# Patient Record
Sex: MALE | Race: WHITE | HISPANIC OR LATINO
[De-identification: names, ages, dates, MRNs, and addresses within clinical notes are randomized per-mention and may not be internally consistent; named-entity substitution may affect disease eponyms.]

---

## 2019-12-27 PROBLEM — Z00.00 ENCOUNTER FOR PREVENTIVE HEALTH EXAMINATION: Status: ACTIVE | Noted: 2019-12-27

## 2020-02-21 ENCOUNTER — APPOINTMENT (OUTPATIENT)
Dept: NEUROLOGY | Facility: CLINIC | Age: 70
End: 2020-02-21

## 2022-12-21 ENCOUNTER — EMERGENCY (EMERGENCY)
Facility: HOSPITAL | Age: 72
LOS: 1 days | Discharge: HOME | End: 2022-12-21
Attending: EMERGENCY MEDICINE
Payer: MEDICARE

## 2022-12-21 VITALS
DIASTOLIC BLOOD PRESSURE: 95 MMHG | RESPIRATION RATE: 20 BRPM | SYSTOLIC BLOOD PRESSURE: 177 MMHG | HEART RATE: 111 BPM | OXYGEN SATURATION: 95 % | TEMPERATURE: 99 F

## 2022-12-21 VITALS — HEART RATE: 90 BPM

## 2022-12-21 PROCEDURE — 99284 EMERGENCY DEPT VISIT MOD MDM: CPT

## 2022-12-21 PROCEDURE — 99283 EMERGENCY DEPT VISIT LOW MDM: CPT

## 2022-12-21 RX ORDER — IBUPROFEN 200 MG
1 TABLET ORAL
Qty: 21 | Refills: 0
Start: 2022-12-21 | End: 2022-12-27

## 2022-12-21 RX ORDER — IBUPROFEN 200 MG
600 TABLET ORAL ONCE
Refills: 0 | Status: DISCONTINUED | OUTPATIENT
Start: 2022-12-21 | End: 2022-12-29

## 2022-12-21 NOTE — ED PROVIDER NOTE - NS ED ROS FT
Gen: denies fever, chills, fatigue, weight loss  Skin: denies rashes, laceration, bruising  HEENT: denies visual changes, ear pain, nasal congestion, throat pain  Respiratory: Admits cough. denies ANDREW, SOB, wheezing  Cardiovascular:  denies chest pain, palpitations, diaphoresis, LE edema  GI: denies abdominal pain, n/v/d  : denies dysuria, frequency, urgency, bowel/bladder incontinence  MSK: denies joint swelling/pain, back pain, neck pain  Neuro: denies headache, dizziness, weakness, numbness  Psych: denies anxiety, depression, SI/HI, visual/auditory hallucinations

## 2022-12-21 NOTE — ED PROVIDER NOTE - OBJECTIVE STATEMENT
72y male no PMHx presents with flu like symptoms. Pt reports 3 days of nausea with vomiting that resolved today. Pt reports cough, nasal congestion and body aches today. pt reports taking tylenol 2 hours PTA without relief of symptoms. Pt reports tactile fever. Pt denies being vaccinated for flu. Pt denies HA, chest pain, SOB, dizziness, nausea, vomiting, abdominal pain.

## 2022-12-21 NOTE — ED PROVIDER NOTE - PHYSICAL EXAMINATION
Gen: No acute distress, non toxic  HEENT: Mucous membranes moist, pink conjunctivae, EOMI. PERRL. Airway patent.   CV: RRR, nl s1/s2.  Resp: CTAB, normal rate and effort. No wheezes, rhonchi, or crackles.   GI: Abdomen soft, NT, ND. No rebound, no guarding  Neuro: A&O x4, Gait intact.  MSK: No midline spinal ttp. No visualized or palpable deformities.  Skin: No rashes, skin intact and well perfused. Cap refill <2sec  Vascular: Radial and dorsalis pedal pulses 2+ b/l

## 2022-12-21 NOTE — ED PROVIDER NOTE - PATIENT PORTAL LINK FT
You can access the FollowMyHealth Patient Portal offered by [facility] by registering at the following website: [email or web address]. By joining [employer]’s FollowMyHealth portal, you will also be able to view your health information using other applications (apps) compatible with our system.

## 2022-12-21 NOTE — ED PROVIDER NOTE - ATTENDING APP SHARED VISIT CONTRIBUTION OF CARE
The patient discussed with PA    Viral syndrome    I, Tony Beyer, performed the initial face to face bedside interview with this patient regarding history of present illness, review of symptoms and relevant past medical, social and family history.  I completed an independent physical examination.  I was the initial provider who evaluated this patient. I have signed out the follow up of any pending tests (i.e. labs, radiological studies) to the ACP.  I have communicated the patient’s plan of care and disposition with the ACP. The patient discussed with PA    Viral syndrome  .  I, Toyn Beyer, performed the initial face to face bedside interview with this patient regarding history of present illness, review of symptoms and relevant past medical, social and family history.  I completed an independent physical examination.  I was the initial provider who evaluated this patient. I have signed out the follow up of any pending tests (i.e. labs, radiological studies) to the ACP.  I have communicated the patient’s plan of care and disposition with the ACP.

## 2022-12-21 NOTE — ED PROVIDER NOTE - NS ED ATTENDING STATEMENT MOD
This was a shared visit with the RYANN. I reviewed and verified the documentation and independently performed the documented:

## 2022-12-21 NOTE — ED PROVIDER NOTE - CLINICAL SUMMARY MEDICAL DECISION MAKING FREE TEXT BOX
72y male with cough, nasal congestion, tactile fever, episodes of diarrhea, vomiting. Flu w/covid, IBU. Follow up out patient.

## 2022-12-21 NOTE — ED PROVIDER NOTE - NSFOLLOWUPINSTRUCTIONS_ED_ALL_ED_FT
- Regrese al servicio de urgencias por cualquier síntoma nuevo o que empeore, incluidos, entre otros, dificultad para respirar, debilidad grave, confusión, etc.  - [place] Tylenol (acetaminofén) 650 mg según sea necesario para el dolor o la fiebre  - Manténgase hasmukh hidratado.  - sae la medicación según las indicaciones  - Evite el contacto con cualquier persona que esté enferma O con poblaciones de riesgo, incluidos ancianos, jóvenes, pacientes embarazadas, personas inmunodeprimidas  - Lávese las erickson con frecuencia con agua tibia y jabón efraín al menos 20 segundos      Infección respiratoria viral    Gordo infección respiratoria viral es gordo enfermedad que afecta partes del cuerpo que se usan para respirar, shmuel los pulmones, la nariz y la garganta. Es causada por un germen llamado virus. Los síntomas pueden incluir secreción nasal, tos, estornudos, fatiga, gale corporales, dolor de garganta, fiebre o dolor de chalo. Se pueden usar medicamentos de venta vera para controlar los síntomas, fidel la infección generalmente desaparece por sí jr en 5 a 10 días.    BUSQUE ATENCIÓN MÉDICA INMEDIATA SI TIENE ALGUNO DE LOS SIGUIENTES SÍNTOMAS: dificultad para respirar, dolor en el pecho, fiebre efraín 10 días o aturdimiento/mareos.    - Return to the ED for any new or worsening symptoms including but not limited to difficulty breathing, severe weakness, confusion, etc.  - Take Tylenol (Acetaminophen) 650mg as needed for pain or fever  - Stay well hydrated.   - take medication as directed   - Avoid contact with anybody who is sick OR at risk populations including elderly, young, pregnant patients, immune compromised people  - Wash hands frequently in warm soapy water for at least 20 seconds       Viral Respiratory Infection    A viral respiratory infection is an illness that affects parts of the body used for breathing, like the lungs, nose, and throat. It is caused by a germ called a virus. Symptoms can include runny nose, coughing, sneezing, fatigue, body aches, sore throat, fever, or headache. Over the counter medicine can be used to manage the symptoms but the infection typically goes away on its own in 5 to 10 days.     SEEK IMMEDIATE MEDICAL CARE IF YOU HAVE ANY OF THE FOLLOWING SYMPTOMS: shortness of breath, chest pain, fever over 10 days, or lightheadedness/dizziness.

## 2023-01-25 ENCOUNTER — EMERGENCY (EMERGENCY)
Facility: HOSPITAL | Age: 73
LOS: 1 days | Discharge: HOME | End: 2023-01-25
Attending: STUDENT IN AN ORGANIZED HEALTH CARE EDUCATION/TRAINING PROGRAM
Payer: MEDICARE

## 2023-01-25 VITALS
HEART RATE: 72 BPM | DIASTOLIC BLOOD PRESSURE: 98 MMHG | SYSTOLIC BLOOD PRESSURE: 150 MMHG | OXYGEN SATURATION: 95 % | RESPIRATION RATE: 20 BRPM | TEMPERATURE: 98 F

## 2023-01-25 VITALS
RESPIRATION RATE: 18 BRPM | OXYGEN SATURATION: 95 % | HEART RATE: 80 BPM | DIASTOLIC BLOOD PRESSURE: 90 MMHG | SYSTOLIC BLOOD PRESSURE: 145 MMHG | TEMPERATURE: 98 F

## 2023-01-25 LAB
ABO RH CONFIRMATION: SIGNIFICANT CHANGE UP
ALBUMIN SERPL ELPH-MCNC: 4.2 G/DL — SIGNIFICANT CHANGE UP (ref 3.3–5.2)
ALP SERPL-CCNC: 77 U/L — SIGNIFICANT CHANGE UP (ref 40–120)
ALT FLD-CCNC: 19 U/L — SIGNIFICANT CHANGE UP
ANION GAP SERPL CALC-SCNC: 8 MMOL/L — SIGNIFICANT CHANGE UP (ref 5–17)
APTT BLD: 39.8 SEC — HIGH (ref 27.5–35.5)
AST SERPL-CCNC: 22 U/L — SIGNIFICANT CHANGE UP
BASOPHILS # BLD AUTO: 0.02 K/UL — SIGNIFICANT CHANGE UP (ref 0–0.2)
BASOPHILS NFR BLD AUTO: 0.2 % — SIGNIFICANT CHANGE UP (ref 0–2)
BILIRUB SERPL-MCNC: 2.4 MG/DL — HIGH (ref 0.4–2)
BLD GP AB SCN SERPL QL: SIGNIFICANT CHANGE UP
BUN SERPL-MCNC: 19.8 MG/DL — SIGNIFICANT CHANGE UP (ref 8–20)
CALCIUM SERPL-MCNC: 9 MG/DL — SIGNIFICANT CHANGE UP (ref 8.4–10.5)
CHLORIDE SERPL-SCNC: 106 MMOL/L — SIGNIFICANT CHANGE UP (ref 96–108)
CO2 SERPL-SCNC: 24 MMOL/L — SIGNIFICANT CHANGE UP (ref 22–29)
CREAT SERPL-MCNC: 0.92 MG/DL — SIGNIFICANT CHANGE UP (ref 0.5–1.3)
EGFR: 89 ML/MIN/1.73M2 — SIGNIFICANT CHANGE UP
EOSINOPHIL # BLD AUTO: 0.03 K/UL — SIGNIFICANT CHANGE UP (ref 0–0.5)
EOSINOPHIL NFR BLD AUTO: 0.3 % — SIGNIFICANT CHANGE UP (ref 0–6)
ETHANOL SERPL-MCNC: 12 MG/DL — HIGH (ref 0–9)
GLUCOSE SERPL-MCNC: 137 MG/DL — HIGH (ref 70–99)
HCT VFR BLD CALC: 43.1 % — SIGNIFICANT CHANGE UP (ref 39–50)
HGB BLD-MCNC: 14.7 G/DL — SIGNIFICANT CHANGE UP (ref 13–17)
IMM GRANULOCYTES NFR BLD AUTO: 0.6 % — SIGNIFICANT CHANGE UP (ref 0–0.9)
INR BLD: 1.21 RATIO — HIGH (ref 0.88–1.16)
LIDOCAIN IGE QN: 22 U/L — SIGNIFICANT CHANGE UP (ref 22–51)
LYMPHOCYTES # BLD AUTO: 0.8 K/UL — LOW (ref 1–3.3)
LYMPHOCYTES # BLD AUTO: 7.3 % — LOW (ref 13–44)
MCHC RBC-ENTMCNC: 31.1 PG — SIGNIFICANT CHANGE UP (ref 27–34)
MCHC RBC-ENTMCNC: 34.1 GM/DL — SIGNIFICANT CHANGE UP (ref 32–36)
MCV RBC AUTO: 91.3 FL — SIGNIFICANT CHANGE UP (ref 80–100)
MONOCYTES # BLD AUTO: 0.67 K/UL — SIGNIFICANT CHANGE UP (ref 0–0.9)
MONOCYTES NFR BLD AUTO: 6.1 % — SIGNIFICANT CHANGE UP (ref 2–14)
NEUTROPHILS # BLD AUTO: 9.32 K/UL — HIGH (ref 1.8–7.4)
NEUTROPHILS NFR BLD AUTO: 85.5 % — HIGH (ref 43–77)
PLATELET # BLD AUTO: 109 K/UL — LOW (ref 150–400)
POTASSIUM SERPL-MCNC: 4.1 MMOL/L — SIGNIFICANT CHANGE UP (ref 3.5–5.3)
POTASSIUM SERPL-SCNC: 4.1 MMOL/L — SIGNIFICANT CHANGE UP (ref 3.5–5.3)
PROT SERPL-MCNC: 6.9 G/DL — SIGNIFICANT CHANGE UP (ref 6.6–8.7)
PROTHROM AB SERPL-ACNC: 14.1 SEC — HIGH (ref 10.5–13.4)
RBC # BLD: 4.72 M/UL — SIGNIFICANT CHANGE UP (ref 4.2–5.8)
RBC # FLD: 14.3 % — SIGNIFICANT CHANGE UP (ref 10.3–14.5)
SARS-COV-2 RNA SPEC QL NAA+PROBE: SIGNIFICANT CHANGE UP
SODIUM SERPL-SCNC: 138 MMOL/L — SIGNIFICANT CHANGE UP (ref 135–145)
WBC # BLD: 10.9 K/UL — HIGH (ref 3.8–10.5)
WBC # FLD AUTO: 10.9 K/UL — HIGH (ref 3.8–10.5)

## 2023-01-25 PROCEDURE — U0003: CPT

## 2023-01-25 PROCEDURE — G1004: CPT

## 2023-01-25 PROCEDURE — 80307 DRUG TEST PRSMV CHEM ANLYZR: CPT

## 2023-01-25 PROCEDURE — 71260 CT THORAX DX C+: CPT | Mod: MA

## 2023-01-25 PROCEDURE — 72125 CT NECK SPINE W/O DYE: CPT | Mod: MA

## 2023-01-25 PROCEDURE — 71045 X-RAY EXAM CHEST 1 VIEW: CPT | Mod: 26

## 2023-01-25 PROCEDURE — 86900 BLOOD TYPING SEROLOGIC ABO: CPT

## 2023-01-25 PROCEDURE — 83690 ASSAY OF LIPASE: CPT

## 2023-01-25 PROCEDURE — 73600 X-RAY EXAM OF ANKLE: CPT | Mod: 26,LT

## 2023-01-25 PROCEDURE — 73564 X-RAY EXAM KNEE 4 OR MORE: CPT

## 2023-01-25 PROCEDURE — 70450 CT HEAD/BRAIN W/O DYE: CPT | Mod: MA

## 2023-01-25 PROCEDURE — U0005: CPT

## 2023-01-25 PROCEDURE — 73590 X-RAY EXAM OF LOWER LEG: CPT

## 2023-01-25 PROCEDURE — 86850 RBC ANTIBODY SCREEN: CPT

## 2023-01-25 PROCEDURE — 36415 COLL VENOUS BLD VENIPUNCTURE: CPT

## 2023-01-25 PROCEDURE — 85610 PROTHROMBIN TIME: CPT

## 2023-01-25 PROCEDURE — 29515 APPLICATION SHORT LEG SPLINT: CPT | Mod: LT

## 2023-01-25 PROCEDURE — 74177 CT ABD & PELVIS W/CONTRAST: CPT | Mod: MA

## 2023-01-25 PROCEDURE — 73564 X-RAY EXAM KNEE 4 OR MORE: CPT | Mod: 26,LT

## 2023-01-25 PROCEDURE — 71045 X-RAY EXAM CHEST 1 VIEW: CPT

## 2023-01-25 PROCEDURE — 85025 COMPLETE CBC W/AUTO DIFF WBC: CPT

## 2023-01-25 PROCEDURE — 86901 BLOOD TYPING SEROLOGIC RH(D): CPT

## 2023-01-25 PROCEDURE — 73700 CT LOWER EXTREMITY W/O DYE: CPT | Mod: MG

## 2023-01-25 PROCEDURE — 73590 X-RAY EXAM OF LOWER LEG: CPT | Mod: 26,LT

## 2023-01-25 PROCEDURE — 80053 COMPREHEN METABOLIC PANEL: CPT

## 2023-01-25 PROCEDURE — 73600 X-RAY EXAM OF ANKLE: CPT

## 2023-01-25 PROCEDURE — 74177 CT ABD & PELVIS W/CONTRAST: CPT | Mod: 26,MA

## 2023-01-25 PROCEDURE — 99285 EMERGENCY DEPT VISIT HI MDM: CPT | Mod: 57

## 2023-01-25 PROCEDURE — 99284 EMERGENCY DEPT VISIT MOD MDM: CPT | Mod: 25

## 2023-01-25 PROCEDURE — 28400 CLTX CALCANEAL FX W/O MNPJ: CPT | Mod: 54,LT

## 2023-01-25 PROCEDURE — 85730 THROMBOPLASTIN TIME PARTIAL: CPT

## 2023-01-25 PROCEDURE — 71260 CT THORAX DX C+: CPT | Mod: 26,MA

## 2023-01-25 PROCEDURE — 70450 CT HEAD/BRAIN W/O DYE: CPT | Mod: 26,MA

## 2023-01-25 PROCEDURE — 73700 CT LOWER EXTREMITY W/O DYE: CPT | Mod: 26,LT,MG

## 2023-01-25 PROCEDURE — 83605 ASSAY OF LACTIC ACID: CPT

## 2023-01-25 PROCEDURE — 72125 CT NECK SPINE W/O DYE: CPT | Mod: 26,MA

## 2023-01-25 RX ORDER — OXYCODONE AND ACETAMINOPHEN 5; 325 MG/1; MG/1
1 TABLET ORAL
Qty: 10 | Refills: 0
Start: 2023-01-25

## 2023-01-25 RX ORDER — OXYCODONE AND ACETAMINOPHEN 5; 325 MG/1; MG/1
1 TABLET ORAL ONCE
Refills: 0 | Status: DISCONTINUED | OUTPATIENT
Start: 2023-01-25 | End: 2023-01-25

## 2023-01-25 NOTE — ED PROVIDER NOTE - PATIENT PORTAL LINK FT
You can access the FollowMyHealth Patient Portal offered by [facility] by registering at the following website: [email or web address]. By joining [employer]’s FollowMyHealth portal, you will also be able to view your health information using other applications (apps) compatible with our system.

## 2023-01-25 NOTE — H&P ADULT - NSHPPHYSICALEXAM_GEN_ALL_CORE
Constitutional: Male in no acute distress,   HEENT: abrasion right forehead, Head is normocephalic , maxillofacial structures stable, no blood or discharge from nares or oral cavity, no sierra sign / racoon eyes, EOMI b/l, pupils [2 ]mm round and reactive to light b/l, no active drainage or redness  Neck: cervical collar in place, trachea midline  Respiratory: Breath sounds CTA b/l respirations are unlabored, no accessory muscle use, no conversational dyspnea  Cardiovascular: Regular rate & rhythm, +S1, S2  Chest: Chest wall is non-tender to palpation, no subQ emphysema or crepitus palpated  Gastrointestinal: Abdomen soft, non-tender, non-distended, no rebound tenderness / guarding, no ecchymosis or external signs of abdominal trauma  Extremities: +echymosis and ttp to left ankle,  moving all extremities spontaneously, no point tenderness or deformity noted to upper or lower extremities b/l  Pelvis: stable  Vascular: 2+ radial, femoral, and DP pulses b/l  Neurological: GCS: 15 (4/5/6). A&O x 3; no gross sensory / motor / coordination deficits  Musculoskeletal: 5/5 strength of upper and lower extremities b/l  Back: +c/t spine ttp, no /LS spine tenderness to palpation, no step-offs or signs of external trauma to the back

## 2023-01-25 NOTE — ED PROVIDER NOTE - PHYSICAL EXAMINATION
Const: Awake, alert and oriented. In no acute distress. Well appearing.  HEENT: NC/AT. No raccoon eyes, no sierra sign, no epistaxis, no septal hematoma, no intraoral lacerations or bleeding, no tongue (+) abrasion to left forehead  Eyes: No scleral icterus. EOMI.  Neck:. Cervical collar in place. (+) cervical tenderness. No palpable step offs.  Chest: Chest wall stable, no flail chest, no crepitus on palpation.  Cardiac: Regular rate and regular rhythm. +S1/S2. No murmurs. 2+ Central pulses and symmetric. Peripheral pulses 2+ and symmetric. No LE edema.  Resp: Speaking in full sentences. No evidence of respiratory distress. No wheezes, rales or rhonchi.  Abd: Soft, non-tender, non-distended. Normal bowel sounds in all 4 quadrants. No guarding or rebound.  MSK: Pelvis stable. (+) left ankle tenderness.   Back: (+) thoracic tenderness. No palpable step offs.  Skin: No rashes, abrasions or lacerations.  Neuro: Awake, alert & oriented x 3. GCS 15. Withdraws to pain symmetrically. Follows commands. 5/5 strength symmetrically all extremities.

## 2023-01-25 NOTE — ED PROVIDER NOTE - NS ED ROS FT
Review of Systems  •	CONSTITUTIONAL - no  fever, no diaphoresis, no weight change  •	SKIN - no rash  •	HEMATOLOGIC - no bleeding, no bruising  •	EYES - no eye pain, no blurred vision  •	ENT - no change in hearing, no pain  •	RESPIRATORY - no shortness of breath, no cough  •	CARDIAC - no chest pain, no palpitations  •	GI - no abd pain, no nausea, no vomiting, no diarrhea, no constipation, no bleeding  •	GENITO-URINARY - no discharge, no dysuria; no hematuria,   •	ENDO - no polydipsia, no polyuria, no heat/no cold intolerance  •	MUSCULOSKELETAL - (+) left ankle joint pain, no swelling, no redness  •	NEUROLOGIC - no weakness, no headache, no anesthesia, no paresthesias  •	PSYCH - no anxiety, non suicidal, non homicidal, no hallucination, no depression

## 2023-01-25 NOTE — ED PROVIDER NOTE - CARE PROVIDER_API CALL
Sergei Camarillo (DPM)  Podiatric Medicine and Surgery  [address] [place] [address]  Phone: [phone]  Fax: [phone]  Follow Up Time: 4-6 Days

## 2023-01-25 NOTE — ED PROVIDER NOTE - OBJECTIVE STATEMENT
71 yo M hx of BPH and abdominal hernia p/w left ankle injury and head injury s/p fall from 30 ft ladder. patient report LOC. no anticoagulation use. His friend drove him to the hospital. trauma B activated.

## 2023-01-25 NOTE — H&P ADULT - NSHPLABSRESULTS_GEN_ALL_CORE
< from: CT Cervical Spine No Cont (01.25.23 @ 16:30) >  BRAIN:  No mass effect, hemorrhage orevidence of acute intracranial pathology.  CERVICAL SPINE:  No fracture or traumatic subluxation.      < from: Xray Chest 1 View AP/PA. (01.25.23 @ 16:26) >  IMPRESSION: No acute finding.

## 2023-01-25 NOTE — H&P ADULT - ASSESSMENT
72yM, fell from ladder, GCS 15, primary survey intact.   -ct h/cspine/c/a/p  -xrays left ankle, tib/fib  -pain control prn  -f/u labs  -dispo per ED if no injuries  72yM, fell from ladder, GCS 15, primary survey intact.. Panscan completed, only has a left calcaneous fracture.    -No trauma surgical intervention required  -Disposition per ED  -pain control prn  -f/u labs  -dispo per ED if no injuries

## 2023-01-25 NOTE — H&P ADULT - ATTENDING SUPERVISION STATEMENT
Problem: Infection - Risk of, Central Venous Catheter-Associated Bloodstream Infection  Goal: *Absence of infection signs and symptoms  Outcome: Progressing Towards Goal     Problem: Patient Education: Go to Patient Education Activity  Goal: Patient/Family Education  Outcome: Progressing Towards Goal Resident

## 2023-01-25 NOTE — H&P ADULT - HISTORY OF PRESENT ILLNESS
72yM, pmhx BPH, presents as trauma B he drove himself to hospital and walked into ED after fall from ladder, patient claims it was 30 feet. Primary survey intact, GCS 15, pt c/o left ankle pain and c spine tenderness. primary survey pertinent findings include c spine/tspine ttp, left ankle ecchymosis and ttp, Vitals stable in trauma bay.  Denies cp, sob, n/v/d, abd pain, ha, f/c, vision changes.  72yM, pmhx BPH, presents as trauma B he driven by a friend to hospital and walked into ED after fall from ladder, patient claims it was 30 feet. Primary survey intact, GCS 15, pt c/o left ankle pain and c spine tenderness. primary survey pertinent findings include c spine/tspine ttp, left ankle ecchymosis and ttp, Vitals stable in trauma bay.  Denies cp, sob, n/v/d, abd pain, ha, f/c, vision changes.  72yM, pmhx BPH, presents as trauma B he driven by a friend to hospital and walked into ED after fall from ladder, patient claims it was 30 feet,admits to LOC. Primary survey intact, GCS 15, pt c/o left ankle pain and c spine tenderness. primary survey pertinent findings include c spine/tspine ttp, left ankle ecchymosis and ttp, Vitals stable in trauma bay.  Denies cp, sob, n/v/d, abd pain, ha, f/c, vision changes.

## 2023-01-25 NOTE — ED PROVIDER NOTE - CLINICAL SUMMARY MEDICAL DECISION MAKING FREE TEXT BOX
71 yo M hx of BPH and abdominal hernia p/w left foot injury s/p fall from 30 foot ladder. patient report head injury, +loc. -AC. CT scan negative. xray showed left calcaneal fracture. podiatry consulted, will need CT foot, follow up with Dr. Camarillo in 2 week. splint and crutches.

## 2023-01-25 NOTE — ED PROVIDER NOTE - PROGRESS NOTE DETAILS
patient found to have communuted left calcaneal fracture. patient cleared by trauma. podiatry consulted, patient can be discharged with follow up with  in clinic. Recommend to get CT foot prior to discharge. splint and crutches. Man: Pt received in signout from Dr. Rangel. CT reviewed. Pt cleared from C-collar. Posterior splint placed. Pt ambulatory with crutches. Stable for discharge with outpatient podiatry f/u.

## 2023-01-25 NOTE — ED PROVIDER NOTE - NSFOLLOWUPINSTRUCTIONS_ED_ALL_ED_FT
Please follow up with podiatry. - Please follow up with podiatry within 1 week.  - Do not bear weight on your left leg.  - Keep the splint dry.  - Return to the emergency room for any new or worsening issues.    =======================================    Calcaneal Fracture    WHAT YOU NEED TO KNOW:    A calcaneal fracture is a break in your calcaneus (heel bone).    DISCHARGE INSTRUCTIONS:    Call your local emergency number ([phone] in the ) if:   •You suddenly feel lightheaded and short of breath.      •You have chest pain when you take a deep breath or cough.      •You cough up blood.      Seek care immediately if:   •You have severe pain.      •Your cast breaks or gets damaged.      •Your toes are numb, swollen, cold, or pale.      •Your leg feels warm, tender, and painful. It may look swollen and red.      Call your doctor if:   •You have a fever.      •You have new blood stains or a bad smell coming from under your cast.      •You have increased pain or swelling, even after treatment.      •You have questions or concerns about your condition or care.      Medicines: You may need any of the following:   •Prescription pain medicine may be given. Ask your healthcare provider how to take this medicine safely. Some prescription pain medicines contain acetaminophen. Do not take other medicines that contain acetaminophen without talking to your healthcare provider. Too much acetaminophen may cause liver damage. Prescription pain medicine may cause constipation. Ask your healthcare provider how to prevent or treat constipation.     •Antibiotics help fight or prevent a bacterial infection.    •Take your medicine as directed. Contact your healthcare provider if you think your medicine is not helping or if you have side effects. Tell your provider if you are allergic to any medicine. Keep a list of the medicines, vitamins, and herbs you take. Include the amounts, and when and why you take them. Bring the list or the pill bottles to follow-up visits. Carry your medicine list with you in case of an emergency.      Self-care:   •Rest your heel as much as possible. Return to normal activities as directed.      •Apply ice to your heel to decrease swelling and pain. Use an ice pack, or put crushed ice in a plastic bag. Cover the bag with a towel before you place it on your heel. Apply ice for 15 to 20 minutes every hour or as directed.      •Elevate your heel above the level of your heart as often as you can. This will help decrease swelling and pain. Prop your leg on pillows or blankets to keep your heel elevated comfortably.   Ice and Elevation           •You may need to take showers until your healthcare provider says a bath is okay. If you have a cast, cover it with 2 plastic trash bags before you bathe. Tape the bags to your skin to keep the water out. Try to bathe with your foot out of the water in case the bag breaks.      •Go to physical therapy if directed. A physical therapist teaches you exercises to help improve movement and strength, and to decrease pain.      Follow up with your doctor as directed: Write down your questions so you remember to ask them during your visits.

## 2023-01-25 NOTE — ED ADULT TRIAGE NOTE - CHIEF COMPLAINT QUOTE
pt a+ox3, presents to ED s/p fall from 30 ft ladder, hit head +loc. abrasion noted to forehead. pt unable to bare weight onto left leg. obvious deformity noted.

## 2023-01-26 PROBLEM — Z78.9 OTHER SPECIFIED HEALTH STATUS: Chronic | Status: ACTIVE | Noted: 2022-12-21
